# Patient Record
Sex: MALE | URBAN - METROPOLITAN AREA
[De-identification: names, ages, dates, MRNs, and addresses within clinical notes are randomized per-mention and may not be internally consistent; named-entity substitution may affect disease eponyms.]

---

## 2022-11-30 ENCOUNTER — OFFICE VISIT (OUTPATIENT)
Dept: URBAN - METROPOLITAN AREA CLINIC 7 | Facility: CLINIC | Age: 67
End: 2022-11-30

## 2022-11-30 ENCOUNTER — TELEPHONE ENCOUNTER (OUTPATIENT)
Dept: URBAN - METROPOLITAN AREA CLINIC 7 | Facility: CLINIC | Age: 67
End: 2022-11-30

## 2022-11-30 ENCOUNTER — LAB OUTSIDE AN ENCOUNTER (OUTPATIENT)
Dept: URBAN - METROPOLITAN AREA CLINIC 7 | Facility: CLINIC | Age: 67
End: 2022-11-30

## 2022-11-30 ENCOUNTER — DASHBOARD ENCOUNTERS (OUTPATIENT)
Age: 67
End: 2022-11-30

## 2022-11-30 VITALS
SYSTOLIC BLOOD PRESSURE: 118 MMHG | BODY MASS INDEX: 26.22 KG/M2 | HEIGHT: 69 IN | WEIGHT: 177 LBS | DIASTOLIC BLOOD PRESSURE: 68 MMHG | TEMPERATURE: 98 F

## 2022-11-30 DIAGNOSIS — K92.1 HEMATOCHEZIA: ICD-10-CM

## 2022-11-30 DIAGNOSIS — I45.10 RBBB: ICD-10-CM

## 2022-11-30 DIAGNOSIS — R19.4 CHANGE IN BOWEL HABITS: ICD-10-CM

## 2022-11-30 DIAGNOSIS — I10 PRIMARY HYPERTENSION: ICD-10-CM

## 2022-11-30 DIAGNOSIS — N28.9 RENAL INSUFFICIENCY: ICD-10-CM

## 2022-11-30 PROBLEM — 449341000124102: Status: ACTIVE | Noted: 2022-11-30

## 2022-11-30 PROBLEM — 59621000: Status: ACTIVE | Noted: 2022-11-30

## 2022-11-30 PROBLEM — 59118001: Status: ACTIVE | Noted: 2022-11-30

## 2022-11-30 PROBLEM — 129851009: Status: ACTIVE | Noted: 2022-11-30

## 2022-11-30 PROBLEM — 236423003: Status: ACTIVE | Noted: 2022-11-30

## 2022-11-30 PROCEDURE — 99204 OFFICE O/P NEW MOD 45 MIN: CPT | Performed by: INTERNAL MEDICINE

## 2022-11-30 RX ORDER — CALCITRIOL 0.25 UG/1
1 CAPSULE CAPSULE ORAL
Qty: 12 | Status: ACTIVE | COMMUNITY
Start: 2022-11-30 | End: 2022-12-30

## 2022-11-30 RX ORDER — LISINOPRIL 40 MG/1
1 TABLET TABLET ORAL ONCE A DAY
Qty: 30 | Status: ACTIVE | COMMUNITY
Start: 2022-11-30

## 2022-11-30 NOTE — PHYSICAL EXAM GASTROINTESTINAL
Abdomen , soft, nontender, nondistended , no guarding or rigidity , no masses palpable , normal bowel sounds , Liver and Spleen,  no hepatosplenomegaly , liver nontender rectal exam- grade 3 non thrombosed hemorrhoid .No blood or stigmata of recent hemorrhage.

## 2022-11-30 NOTE — HPI-TODAY'S VISIT:
Patient seen today with a report of gastrointestinal bleeding. Had change in bowel habits with constipation a few weeks ago. Noted some recent perirectal swelling for which took prep H and the next days had hematochezia.  moderate  volume Blood was  red , maroon No change in bowel habits No abdominal pain. No nausea or vomiting. No current overt bleeding. No fevers or chills No dyspnea No chest pain  No syncope or presyncopal symptoms. No weight loss No history of significant NSAID use No current anticoagulation use. No past history of gastrointestinal bleeding. No prbc transfusion requirements in the past.

## 2023-03-16 ENCOUNTER — TELEPHONE ENCOUNTER (OUTPATIENT)
Dept: URBAN - METROPOLITAN AREA CLINIC 7 | Facility: CLINIC | Age: 68
End: 2023-03-16